# Patient Record
Sex: FEMALE | HISPANIC OR LATINO
[De-identification: names, ages, dates, MRNs, and addresses within clinical notes are randomized per-mention and may not be internally consistent; named-entity substitution may affect disease eponyms.]

---

## 2023-02-13 ENCOUNTER — APPOINTMENT (OUTPATIENT)
Dept: ENDOCRINOLOGY | Facility: CLINIC | Age: 83
End: 2023-02-13
Payer: MEDICARE

## 2023-02-13 VITALS
HEIGHT: 59 IN | BODY MASS INDEX: 23.79 KG/M2 | DIASTOLIC BLOOD PRESSURE: 80 MMHG | WEIGHT: 118 LBS | SYSTOLIC BLOOD PRESSURE: 120 MMHG

## 2023-02-13 DIAGNOSIS — E78.5 HYPERLIPIDEMIA, UNSPECIFIED: ICD-10-CM

## 2023-02-13 DIAGNOSIS — I10 ESSENTIAL (PRIMARY) HYPERTENSION: ICD-10-CM

## 2023-02-13 DIAGNOSIS — E11.65 TYPE 2 DIABETES MELLITUS WITH HYPERGLYCEMIA: ICD-10-CM

## 2023-02-13 PROBLEM — Z00.00 ENCOUNTER FOR PREVENTIVE HEALTH EXAMINATION: Status: ACTIVE | Noted: 2023-02-13

## 2023-02-13 PROCEDURE — 99205 OFFICE O/P NEW HI 60 MIN: CPT

## 2023-02-13 NOTE — ASSESSMENT
[Long Term Vascular Complications] : long term vascular complications of diabetes [Carbohydrate Consistent Diet] : carbohydrate consistent diet [Importance of Diet and Exercise] : importance of diet and exercise to improve glycemic control, achieve weight loss and improve cardiovascular health [Hypoglycemia Management] : hypoglycemia management [Action and use of Insulin] : action and use of short and long-acting insulin [Self Monitoring of Blood Glucose] : self monitoring of blood glucose [Injection Technique, Storage, Sharps Disposal] : injection technique, storage, and sharps disposal [FreeTextEntry1] : 1) DM2: Uncontrolled, A1C age adjusted  target of <8%. Natural hx of the disease and importance of treatment targets discussed at length, she verbalized understanding. ADA diet and importance of exercise discussed at length. Plan is to cont metformin to full dose and GLP-1 agonist today (Trulicity). Reduce Lantus to 46 units QHS, confirm insulinopenic status pending initiation of prandial insulin. titration instructions provided. Refer to Nutritionist today. We [place] check microalbumin, lipids and labs on the NV. Discuss vaccines and podiatry/opthalmology referrals on NV\par \par 3) Essential HTN: Pt is at goal BP and on an Arb. Reassess microalbumin prior to the NV.\par  \par 4) Dyslipidemia: Pt is on a moderate intensity statin. Atorvastatin 40 mg QDaily. REassess lipids on the next visit. LDL target <100.\par

## 2023-02-13 NOTE — HISTORY OF PRESENT ILLNESS
[FreeTextEntry1] : 83 y/o F w/ Hx of DM2. HTN, HLD, CAD, AD\par here for initial evaluation and management of diabetes\par generally feels well and endorses no acute complaints.\par hx provided by [facility]. pt has been a diabetic for over 40 years. reports fair control up until ~1 y/a. no clear trigger. they reports adherence to Lantus, dose has been gradually increased to 52 units QHS. they assist w/ administration and can confirm adequate placement w/ alternation of injection site. FSG in AM ~170-200. they deny hypoglycemia. they do report post prandial spikes up to 300. she has been on trulicity 1.5 mg but they report use every 2 weeks given poor appetite. they also reprot adherence to metfromin max dose. She otherwise denies any f/c, CP, SOB, palpitations, tremors, depressed mood, anxiety, palpitations, n/v, stool/urinary abn, skin/weight changes, heat/cold intolerance, HAs, breast/nipple changes, polyuria/polydipsia/nocturia or other complaints.\par \par

## 2023-02-14 LAB — MICROALBUMIN/CREAT 24H UR-RTO: 14

## 2023-03-27 ENCOUNTER — APPOINTMENT (OUTPATIENT)
Dept: ENDOCRINOLOGY | Facility: CLINIC | Age: 83
End: 2023-03-27